# Patient Record
Sex: FEMALE | URBAN - METROPOLITAN AREA
[De-identification: names, ages, dates, MRNs, and addresses within clinical notes are randomized per-mention and may not be internally consistent; named-entity substitution may affect disease eponyms.]

---

## 2022-03-07 ENCOUNTER — APPOINTMENT (RX ONLY)
Dept: URBAN - METROPOLITAN AREA CLINIC 74 | Facility: CLINIC | Age: 37
Setting detail: DERMATOLOGY
End: 2022-03-07

## 2022-03-07 DIAGNOSIS — L21.8 OTHER SEBORRHEIC DERMATITIS: ICD-10-CM | Status: INADEQUATELY CONTROLLED

## 2022-03-07 PROCEDURE — 99203 OFFICE O/P NEW LOW 30 MIN: CPT

## 2022-03-07 PROCEDURE — ? PATIENT SPECIFIC COUNSELING

## 2022-03-07 PROCEDURE — ? PRESCRIPTION

## 2022-03-07 PROCEDURE — ? TREATMENT REGIMEN

## 2022-03-07 PROCEDURE — ? COUNSELING

## 2022-03-07 RX ORDER — CICLOPIROX 10 MG/.96ML
SHAMPOO TOPICAL
Qty: 120 | Refills: 5 | Status: ERX | COMMUNITY
Start: 2022-03-07

## 2022-03-07 RX ORDER — FLUOCINONIDE 0.5 MG/ML
SOLUTION TOPICAL
Qty: 60 | Refills: 3 | Status: ERX | COMMUNITY
Start: 2022-03-07

## 2022-03-07 RX ADMIN — FLUOCINONIDE: 0.5 SOLUTION TOPICAL at 00:00

## 2022-03-07 RX ADMIN — CICLOPIROX: 10 SHAMPOO TOPICAL at 00:00

## 2022-03-07 ASSESSMENT — LOCATION SIMPLE DESCRIPTION DERM: LOCATION SIMPLE: ANTERIOR SCALP

## 2022-03-07 ASSESSMENT — LOCATION DETAILED DESCRIPTION DERM: LOCATION DETAILED: MID-FRONTAL SCALP

## 2022-03-07 ASSESSMENT — LOCATION ZONE DERM: LOCATION ZONE: SCALP

## 2022-03-07 NOTE — PROCEDURE: PATIENT SPECIFIC COUNSELING
- pt reports itchiness, irritation, and flaking over the scalp that began +3 years ago\\n- pt has tried otc nizoral shampoo w/ no adequate improvement\\n- scalp irritated and red on examination today\\n- disc r/b/sed of ciclopirox shampoo and fluocinonide soln. Rxs sent today.\\n- pt to start ciclopirox 1 % shampoo - Apply to scalp tiw, let sit 5-10 minutes, then rinse\\n- pt to start fluocinonide 0.05 % topical solution - apply to affected areas on scalp qhs x 7-10 days, break x 1 wk, repeat cycle prn flares\\n- RTC months
Detail Level: Zone

## 2022-03-07 NOTE — PROCEDURE: MIPS QUALITY
Quality 110: Preventive Care And Screening: Influenza Immunization: Influenza Immunization not Administered for Documented Reasons.
Detail Level: Detailed
Additional Notes: Pt is COVID vaccinated
Quality 130: Documentation Of Current Medications In The Medical Record: Current Medications Documented

## 2022-03-07 NOTE — HPI: ITCHING (SCALP)
How Did The Scalp Condition Occur?: gradual in onset  (over a period of years)
How Severe Is The Scalp Condition?: moderate
Additional History: Pt presents with an itchy scalp that began 3 years ago. Associated symptoms include flaking. Pt has tried otc nizoral with no improvement. No other prior tx tried. Pt washes hair every 2 days. Here for evaluation.

## 2022-03-07 NOTE — PROCEDURE: TREATMENT REGIMEN
Detail Level: Zone
Initiate Treatment: ciclopirox 1 % shampoo : Apply to scalp tiw, let sit 5-10 minutes, then rinse\\nfluocinonide 0.05 % topical solution : apply to affected areas on scalp qhs x 7-10 days, break x 1 wk, repeat cycle prn flares